# Patient Record
Sex: FEMALE | Race: OTHER | Employment: PART TIME
[De-identification: names, ages, dates, MRNs, and addresses within clinical notes are randomized per-mention and may not be internally consistent; named-entity substitution may affect disease eponyms.]

---

## 2023-12-13 ENCOUNTER — HOSPITAL ENCOUNTER (OUTPATIENT)
Facility: HOSPITAL | Age: 43
Discharge: HOME | End: 2023-12-16

## 2023-12-13 ENCOUNTER — OFFICE VISIT (OUTPATIENT)
Age: 43
End: 2023-12-13

## 2023-12-13 VITALS — WEIGHT: 160 LBS

## 2023-12-13 DIAGNOSIS — Z90.710 HISTORY OF HYSTERECTOMY FOR CANCER: ICD-10-CM

## 2023-12-13 DIAGNOSIS — Z85.42 HISTORY OF UTERINE CANCER: ICD-10-CM

## 2023-12-13 DIAGNOSIS — Z01.419 ENCOUNTER FOR WELL WOMAN EXAM: Primary | ICD-10-CM

## 2023-12-13 DIAGNOSIS — Z12.31 VISIT FOR SCREENING MAMMOGRAM: ICD-10-CM

## 2023-12-13 PROCEDURE — 77063 BREAST TOMOSYNTHESIS BI: CPT

## 2023-12-13 NOTE — PROGRESS NOTES
EVERY WOMANS LIFE HISTORY QUESTIONNAIRE       No Yes Comments   Has a doctor ever seen or felt anything wrong with your breast? [x]                                  []                                     Have you ever had a breast biopsy? [x]                                  []                                          When and where was last mammogram performed? 2021 [place]    Have you ever been told that there was a problem on your mammogram?   No Yes Comments   [x]                                  []                                       Do you have breast implants? No Yes Comments   [x]                                  []                                       When was your last Pap test performed? 6 months ago with [facility]. Pt declined [facility] pelvic services. Have you ever had an abnormal Pap test?   No Yes Comments   [x]                                  []                                       Have you had a hysterectomy? No Yes Comments (why)   []                                  [x]                                  2019 problems with uterus     Have you been through menopause? No Yes Date of LMP   [x]                                  []                                  2019     Did your mother take JUNO? No Yes Unknown   []                                  []                                  x     Do you have a history of HIV exposure? No Yes    [x]                                  []                                       Have you ever been diagnosed with any type of Cancer   No Yes Comments (type,when,where,type of treatment   []                                  [x]                                  2019 Uterine/cervical cancer. Pt had chemo and hysterectomy. Pt had treatment in [place]        Has a family member been diagnosed with breast or ovarian cancer?    No Yes Comments (which family members, and type   [x]                                  []

## 2023-12-13 NOTE — PROGRESS NOTES
Assessment/Plan:    Tony Aleman was seen today for ewl. Diagnoses and all orders for this visit:    Encounter for well woman exam    History of uterine cancer    History of hysterectomy for cancer  Not clear if uterine or cervical cancer but she had chemo and hysterectomy (was dx'd with pap she states)      No follow-up provider specified. ZACHARY Marte expressed understanding of this plan. An AVS was printed and given to the patient.      ----------------------------------------------------------------------    Chief Complaint   Patient presents with    [facility]       History of Present Illness:  [facility] annual well woman visit  She is    She was dx'd with cancer in 2018 when she had a pap in her country [facility])  She subsequently had chemo and a hysterectomy. She had her last pap about 6 months ago at the [facility]  She is single, not in a relationship  She has no breast concerns or complaints  She denies risk for DV       No past medical history on file. No current outpatient medications on file. No current facility-administered medications for this visit. No Known Allergies    Social History     Tobacco Use    Smoking status: Never    Smokeless tobacco: Never       No family history on file. Physical Exam:     There were no vitals taken for this visit.     A&Ox3  WDWN NAD  Respirations normal and non labored  Breast exam- julio neg for mass, tenderness, skin color changes, dimpling or retractions